# Patient Record
(demographics unavailable — no encounter records)

---

## 2024-10-30 NOTE — HISTORY OF PRESENT ILLNESS
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 For our records, please allow me to summarize the history and my findings.   This pleasant 51-year-old woman has a cardiovascular history significant for benign thyroid nodule status post RFA but otherwise healthy presenting with palpitations/SVT who is here for an initial evaluation.  She reports a long history of palpitations.  She had 2 episodes during her pregnancies.  More recently her episodes have increased.  On 10/24/2024 she was cooking and had sudden onset palpitations.  She had some stress at the time with her children.  She called EMS.  Upon EMS arrival her heart rate was noted to be in the 190s.  She was taken to the ED and received adenosine x 2.  Her heart rate broke afterwards.  She was discharged home without any medications.  She continues to feel ongoing palpitations.  She is very anxious about does not wish to return to the ED.  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

## 2024-10-30 NOTE — CARDIOLOGY SUMMARY
[de-identified] : 10/30/24 Sinus rhythm at 87 bpm [de-identified] : 10/25/24 CXR: who is here for an initial evaluation.

## 2024-10-30 NOTE — PHYSICAL EXAM
[Well Developed] : well developed [Well Nourished] : well nourished [No Acute Distress] : no acute distress [Normal Conjunctiva] : normal conjunctiva [Normal Venous Pressure] : normal venous pressure [Normal S1, S2] : normal S1, S2 [Clear Lung Fields] : clear lung fields [Good Air Entry] : good air entry [No Respiratory Distress] : no respiratory distress  [Soft] : abdomen soft [Non Tender] : non-tender [Normal Bowel Sounds] : normal bowel sounds [Normal Gait] : normal gait [No Edema] : no edema [No Cyanosis] : no cyanosis [No Clubbing] : no clubbing [No Varicosities] : no varicosities [No Rash] : no rash [No Skin Lesions] : no skin lesions [Moves all extremities] : moves all extremities [No Focal Deficits] : no focal deficits [Normal Speech] : normal speech [Alert and Oriented] : alert and oriented [Normal memory] : normal memory

## 2024-10-30 NOTE — DISCUSSION/SUMMARY
[EKG obtained to assist in diagnosis and management of assessed problem(s)] : EKG obtained to assist in diagnosis and management of assessed problem(s) [FreeTextEntry1] : In summary, this is a 51-year-old won man with benign thyroid nodule status post RFA but otherwise healthy presenting with palpitations/SVT who is here for an initial evaluation.   I reviewed her EKG from [place] and is consistent with a narrow complex SVT.  Given her recurrent symptoms, hospitalization, and anxiety about it she would benefit from an SVT ablation. The rationale for the procedure as well as its risks--including but not limited to bleeding, vascular injury, pericardial effusion/tamponade, heart block requiring pacemaker, stroke, and death--were reviewed in detail. After consideration of this information, the decision was made to proceed with the procedure.  I started her on Metoprolol 25mg XL daily today.  She also requested to wear a 1 week event monitor which I gave her today.  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  Thank you for allowing me to be involved in the care of this pleasant woman. Please feel free to contact me with any questions.

## 2024-12-05 NOTE — PHYSICAL EXAM
[Alert] : alert [Well Nourished] : well nourished [Healthy Appearance] : healthy appearance [No Acute Distress] : no acute distress [Well Developed] : well developed [Normal Voice/Communication] : normal voice communication [Normal Sclera/Conjunctiva] : normal sclera/conjunctiva [No Proptosis] : no proptosis [No Neck Mass] : no neck mass was observed [No LAD] : no lymphadenopathy [Supple] : the neck was supple [No Respiratory Distress] : no respiratory distress [Normal Affect] : the affect was normal [Normal Insight/Judgement] : insight and judgment were intact [Normal Mood] : the mood was normal [de-identified] : Approximately 4 cm palpable, mobile left thyroid nodule.

## 2024-12-05 NOTE — PHYSICAL EXAM
[Alert] : alert [Well Nourished] : well nourished [Healthy Appearance] : healthy appearance [No Acute Distress] : no acute distress [Well Developed] : well developed [Normal Voice/Communication] : normal voice communication [Normal Sclera/Conjunctiva] : normal sclera/conjunctiva [No Proptosis] : no proptosis [No Neck Mass] : no neck mass was observed [No LAD] : no lymphadenopathy [Supple] : the neck was supple [No Respiratory Distress] : no respiratory distress [Normal Affect] : the affect was normal [Normal Insight/Judgement] : insight and judgment were intact [Normal Mood] : the mood was normal [de-identified] : Approximately 4 cm palpable, mobile left thyroid nodule.

## 2024-12-10 NOTE — HISTORY OF PRESENT ILLNESS
[FreeTextEntry1] : CC: Thyroid nodule and pituitary adenoma This is a 52-year-old female with thyroid nodules, pituitary adenoma, vitamin D insufficiency, SVT, here for follow-up.  Reports she believes she first noticed lump on her neck in 2021. Thyroid ultrasound from June 2021 showed left 23 x 16 x 11 mm solid and cystic nodule with solid isoechoic mural nodularity. Subsequent thyroid ultrasound from September 2022 showed increase in size of nodule (2.9 x 1.4 x 1.4 cm). Thyroid ultrasound form June 2023 showed left 3.7 x 1.9 x 2.8 cm part cystic part solid isoechoic nodule. FNA 10/26/2023 of left lowerpole 3.6 cm thyroid nodule, benign ([place] II).  She saw Dr. Kong and underwent RFA on 05/17/2024 however, procedure was stopped early due to discomfort in ears.    Reports in 2012 was found to have hyperprolactinemia and pituitary adenoma was found on MRI pituitary.  MRI pituitary from 01/08/2024 showed a stable probable 4 mm pituitary microadenoma.   No history of radiation to head or neck. Denies obstructive symptoms. No family history of thyroid cancer.

## 2024-12-10 NOTE — HISTORY OF PRESENT ILLNESS
[FreeTextEntry1] : CC: Thyroid nodule and pituitary adenoma This is a 52-year-old female with thyroid nodules, pituitary adenoma, vitamin D insufficiency, SVT, here for follow-up.  Reports she believes she first noticed lump on her neck in 2021. Thyroid ultrasound from June 2021 showed left 23 x 16 x 11 mm solid and cystic nodule with solid isoechoic mural nodularity. Subsequent thyroid ultrasound from September 2022 showed increase in size of nodule (2.9 x 1.4 x 1.4 cm). Thyroid ultrasound form June 2023 showed left 3.7 x 1.9 x 2.8 cm part cystic part solid isoechoic nodule. FNA 10/26/2023 of left lowerpole 3.6 cm thyroid nodule, benign ([place] II).  She saw Dr. Kong and underwent RFA on 05/17/2024 however, procedure was stopped early due to discomfort in ears.    Reports in 2012 was found to have hyperprolactinemia and pituitary adenoma was found on MRI pituitary.  MRI pituitary from 01/08/2024 showed a stable probable 4 mm pituitary microadenoma.   No history of radiation to head or neck. Denies obstructive symptoms. No family history of thyroid cancer.

## 2024-12-10 NOTE — REASON FOR VISIT
[Consultation] : a consultation visit [Pituitary Evaluation/ Disorder] : pituitary evaluation/disorder [Thyroid nodule/ MNG] : thyroid nodule/ MNG [FreeTextEntry2] : Dr. Bishop

## 2024-12-10 NOTE — ASSESSMENT
[FreeTextEntry1] : This is a 52-year-old female with thyroid nodules, pituitary adenoma, vitamin D insufficiency, SVT, here for a follow-up.  1. Thyroid nodule She first noticed lump on her neck in 2021. Thyroid ultrasound from June 2021 showed left 23 x 16 x 11 mm solid and cystic nodule with solid isoechoic mural nodularity. Subsequent thyroid ultrasound from September 2022 showed increase in size of nodule (2.9 x 1.4 x 1.4 cm). Thyroid ultrasound form June 2023 showed left 3.7 x 1.9 x 2.8 cm part cystic part solid isoechoic nodule. FNA 10/26/2023 of left lower 3.6 cm thyroid nodule, benign ([place] II).  She saw Dr. Kong and underwent RFA on 05/17/2024 however, procedure was stopped early due to discomfort in ears.  Check thyroid ultrasound.  Check TFTs. She is clinically euthyroid. Reviewed risk of autonomously functioning thyroid nodule given large size. Signs/symptoms of hyperthyroidism reviewed. 2. Pituitary Adenoma  MRI pituitary from 01/08/2024 showed a stable probable 4 mm pituitary microadenoma.  Reports in 2012 was found to have hyperprolactinemia and pituitary adenoma was found on MRI pituitary.  Check anterior pituitary hormones.  Check prolactin.  Check MRI pituitary in January 2026.  3. Vitamin D insufficiency  She takes 2000 IU vitamin D3. Check 25 vitamin D.

## 2024-12-10 NOTE — ADDENDUM
[FreeTextEntry1] :  By signing my name below, I, Carey Andrews, attest that this document has been prepared under the direction and in the presence of Dr. Santos.  I, Pamela Santos MD, personally performed the services described in this documentation. All medical record entries made by the scribe were at my discretion and in my presence. I have reviewed the chart and discharge instructions (if applicable) and agree that the record reflects my personal permanence and is accurate and complete.

## 2024-12-10 NOTE — ASSESSMENT
[FreeTextEntry1] : This is a 52-year-old female with thyroid nodules, pituitary adenoma, vitamin D insufficiency, SVT, here for a follow-up.  1. Thyroid nodule She first noticed lump on her neck in 2021. Thyroid ultrasound from June 2021 showed left 23 x 16 x 11 mm solid and cystic nodule with solid isoechoic mural nodularity. Subsequent thyroid ultrasound from September 2022 showed increase in size of nodule (2.9 x 1.4 x 1.4 cm). Thyroid ultrasound form June 2023 showed left 3.7 x 1.9 x 2.8 cm part cystic part solid isoechoic nodule. FNA 10/26/2023 of left lower 3.6 cm thyroid nodule, benign ([place] II).  She saw Dr. Kong and underwent RFA on 05/17/2024 however, procedure was stopped early due to discomfort in ears.  Check thyroid ultrasound.  Check TFTs. She is clinically euthyroid. Reviewed risk of autonomously functioning thyroid nodule given large size. Signs/symptoms of hyperthyroidism reviewed. 2. Pituitary Adenoma  MRI pituitary from 01/08/2024 showed a stable probable 4 mm pituitary microadenoma.  Reports in 2012 was found to have hyperprolactinemia and pituitary adenoma was found on MRI pituitary.  Check anterior pituitary hormones.  Check prolactin.  Check MRI pituitary in January 2026.  3. Vitamin D insufficiency  She takes 2000 IU vitamin D3. Check 25 vitamin D.

## 2024-12-18 NOTE — CARDIOLOGY SUMMARY
[de-identified] : 12/18/24  Sinus rhythm at 81 bpm 10/30/24 Sinus rhythm at 87 bpm [de-identified] : 10/25/24 CXR: who is here for an initial evaluation.

## 2024-12-18 NOTE — DISCUSSION/SUMMARY
[EKG obtained to assist in diagnosis and management of assessed problem(s)] : EKG obtained to assist in diagnosis and management of assessed problem(s) [FreeTextEntry1] : In summary, this is a 52-year-old won man with benign thyroid nodule status post RFA, and AVNRT status post slow pathway modification.  She continues to do well post ablation without recurrent of her AVNRT.  Suspect her episodes in the 120s either sinus tach versus an AT.  Regardless she has only had 2 episodes and they have responded well to as needed metoprolol.  She will continue observation on as needed metoprolol at this time.  I will reevaluate her in 6 months.  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  Thank you for allowing me to be involved in the care of this pleasant woman. Please feel free to contact me with any questions.

## 2024-12-18 NOTE — HISTORY OF PRESENT ILLNESS
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 For our records, please allow me to summarize the history and my findings.   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  She had been experiencing a long history of palpitations.  Which had worsened more recently.  This required hospitalization on 10/24/2024.  It was adenosine sensitive.  She was subsequently brought to the EP lab as an outpatient and diagnosed with AVNRT.  She underwent slow pathway modification.  Post ablation she had 2 short episodes of heart rates in the 120s to 130s which improved with metoprolol.  She has not had any recurrent symptoms since.  Her right groin is healed well since the ablation.  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

## 2025-01-13 NOTE — HISTORY OF PRESENT ILLNESS
[FreeTextEntry8] : 52-year-old female here for acute visit with c/o elevated HR & SOB. Reports elevated HR of 104-112 for a few minutes while laying down a/w SOB. States HR lowered on its own. Also reports similar episode when sitting down on her computer. Took Metoprolol 25mg tab as needed for 3 days.  Notes hx of SVT 10/2024, s/p ablation 11/2024.  Denies palpitations, dizziness, or lightheadedness, chest pain, or fatigue at this time. Does endorse increased anxiety since her procedure. States she took CBD oil with some relief, & was able to sleep better.

## 2025-01-13 NOTE — ASSESSMENT
[FreeTextEntry1] : HR within normal limits. BP stable.  Given prev hx, EKG done today. EKG reviewed; stable.  Symptoms likely due to anxiety, discussed breathing techniques, relaxation techniques, and coping mechanisms. Pt will f/u with Cardiologist. Continue Metoprolol 25mg as needed.    Pt advised to f/u for persisting s/s / change in condition.

## 2025-01-13 NOTE — PHYSICAL EXAM
[No Acute Distress] : no acute distress [Well Nourished] : well nourished [Well Developed] : well developed [No Respiratory Distress] : no respiratory distress  [No Accessory Muscle Use] : no accessory muscle use [Clear to Auscultation] : lungs were clear to auscultation bilaterally [Normal Rate] : normal rate  [Regular Rhythm] : with a regular rhythm [Normal S1, S2] : normal S1 and S2 [Pedal Pulses Present] : the pedal pulses are present [No Edema] : there was no peripheral edema [No Extremity Clubbing/Cyanosis] : no extremity clubbing/cyanosis [Normal Insight/Judgement] : insight and judgment were intact [de-identified] : anxious

## 2025-05-21 NOTE — CURRENT MEDS
Pts job called [employer] and just would like a call back and an updated letter there was no date on the letter that was given to pt on 09/09/2024 please call [employer] at #[phone] and fax updated letter to #[phone]  mz   [Takes medication as prescribed] : takes [None] : Patient does not have any barriers to medication adherence

## 2025-05-21 NOTE — HEALTH RISK ASSESSMENT
[Good] : ~his/her~  mood as  good [No] : In the past 12 months have you used drugs other than those required for medical reasons? No [0] : 2) Feeling down, depressed, or hopeless: Not at all (0) [PHQ-2 Negative - No further assessment needed] : PHQ-2 Negative - No further assessment needed [Never] : Never [NO] : No [Patient reported mammogram was normal] : Patient reported mammogram was normal [Patient reported colonoscopy was normal] : Patient reported colonoscopy was normal [FreeTextEntry1] : health maintenance  [Time Spent: ___ Minutes] : I spent [unfilled] minutes performing a depression screening for this patient. [[id number]] : 0 [de-identified] : Current non-smoker  [MammogramDate] : 06/21 [MammogramComments] : BIRADS 1D- Negative-Dense [PapSmearComments] : n/a [BoneDensityComments] : n/a [ColonoscopyDate] : 10/23 [ColonoscopyComments] : Repeat colonoscopy in 10 years for screening purposes.

## 2025-05-21 NOTE — ADDENDUM
Pharyngitis   WHAT YOU NEED TO KNOW:   Pharyngitis, or sore throat, is inflammation of the tissues and structures in your pharynx (throat)  Pharyngitis is most often caused by bacteria  It may also be caused by a cold or flu virus  Other causes include smoking, allergies, or acid reflux  DISCHARGE INSTRUCTIONS:   Call 911 for any of the following:   · You have trouble breathing or swallowing because your throat is swollen or sore  Return to the emergency department if:   · You are drooling because it hurts too much to swallow  · Your fever is higher than 102? F (39?C) or lasts longer than 3 days  · You are confused  · You taste blood in your throat  Contact your healthcare provider if:   · Your throat pain gets worse  · You have a painful lump in your throat that does not go away after 5 days  · Your symptoms do not improve after 5 days  · You have questions or concerns about your condition or care  Medicines:  Viral pharyngitis will go away on its own without treatment  Your sore throat should start to feel better in 3 to 5 days for both viral and bacterial infections  You may need any of the following:  · Antibiotics  treat a bacterial infection  · NSAIDs , such as ibuprofen, help decrease swelling, pain, and fever  NSAIDs can cause stomach bleeding or kidney problems in certain people  If you take blood thinner medicine, always ask your healthcare provider if NSAIDs are safe for you  Always read the medicine label and follow directions  · Acetaminophen  decreases pain and fever  It is available without a doctor's order  Ask how much to take and how often to take it  Follow directions  Acetaminophen can cause liver damage if not taken correctly  · Take your medicine as directed  Contact your healthcare provider if you think your medicine is not helping or if you have side effects  Tell him or her if you are allergic to any medicine   Keep a list of the medicines, vitamins, [FreeTextEntry1] : I, Ganesh Santos, acted as a scribe on behalf of Dr. River Bishop MD, on 05/21/2025.   All medical entries made by the scribe were at my, Dr. River Bishop MD, direction and personally dictated by me on 05/21/2025. I have reviewed the chart and agree that the record accurately reflects my personal performance of the history, physical exam, assessment and plan. I have also personally directed, reviewed, and agreed with the chart. and herbs you take  Include the amounts, and when and why you take them  Bring the list or the pill bottles to follow-up visits  Carry your medicine list with you in case of an emergency  Manage your symptoms:   · Gargle salt water  Mix ¼ teaspoon salt in an 8 ounce glass of warm water and gargle  This may help decrease swelling in your throat  · Drink liquids as directed  You may need to drink more liquids than usual  Liquids may help soothe your throat and prevent dehydration  Ask how much liquid to drink each day and which liquids are best for you  · Use a cool-steam humidifier  to help moisten the air in your room and calm your cough  · Soothe your throat  with cough drops, ice, soft foods, or popsicles  Prevent the spread of pharyngitis:  Cover your mouth and nose when you cough or sneeze  Do not share food or drinks  Wash your hands often  Use soap and water  If soap and water are unavailable, use an alcohol based hand   Follow up with your healthcare provider as directed:  Write down your questions so you remember to ask them during your visits  © Copyright [address] [place] [address] Drive Information is for End User's use only and may not be sold, redistributed or otherwise used for commercial purposes  All illustrations and images included in CareNotes® are the copyrighted property of [employer]  or [facility] Flora Guerin  The above information is an  only  It is not intended as medical advice for individual conditions or treatments  Talk to your doctor, nurse or pharmacist before following any medical regimen to see if it is safe and effective for you

## 2025-05-21 NOTE — ASSESSMENT
[Vaccines Reviewed] : Immunizations reviewed today. Please see immunization details in the vaccine log within the immunization flowsheet.  [FreeTextEntry1] :   Annual Physical Exam: Paroxysmal SVT (supraventricular tachycardia), Pituitary lesion - BP is stable. Continue current management. - Check A1c, CBC, CMP, Lipid profile, Vitamin levels, Urinalysis, TSH   - RTO annually or as needed.   Pt verbalized understanding and will reach should any questions/concerns occur.

## 2025-05-21 NOTE — HISTORY OF PRESENT ILLNESS
[FreeTextEntry1] : Patient is present today to establish care and for a comprehensive Annual Physical Exam. [de-identified] : Patient is a 52yr old female who is present today to establish care and for a comprehensive Annual Physical Exam.  Patient is doing well overall. Patient reports Hx of arrythmia, ablation was done. Has been stable ever since ablation. Has not used Metoprolol since November, carries around for safety. Stays hydrated with water. Stress levels are stable. Reports she was stressed on the day of episode (screaming at kids), specialist denies being attributed to symptoms. Is currently still menstruating, also experiences constipation. Pt also c/o belly fat. Colonoscopy UTD and normal. Started Cilium about one week ago, confirms being active. Vision is stable. Denies anxiety and depression. Mammo UTD and normal. Sleep is normal. Has been following with Magnesium supplements. Has done first dose of Shingles vaccine, confirms reaction to injection. Denies intention to receive second dose. Reports thirty years ago, she experienced sore spot in upper chest which still presents to this day, possibly attributed to muscle.  Denies any CP, chest tightness or SOB. Denies any abdominal pain, urinary symptom, or change in bowel habits. Denies any fever, chills, or night sweats.

## 2025-05-28 NOTE — HISTORY OF PRESENT ILLNESS
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 For our records, please allow me to summarize the history and my findings.   This pleasant 52-year-old woman has a history significant for benign thyroid nodule status post RFA, recurrent AVNRT status post ablation on 11/21/2024.  She had been experiencing a long history of palpitations.  Which had worsened more recently.  This required hospitalization on 10/24/2024.  It was adenosine sensitive.  She was subsequently brought to the EP lab as an outpatient and diagnosed with AVNRT.  She underwent slow pathway modification. She has been doing well since without any further episodes.   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

## 2025-05-28 NOTE — DISCUSSION/SUMMARY
[EKG obtained to assist in diagnosis and management of assessed problem(s)] : EKG obtained to assist in diagnosis and management of assessed problem(s) [FreeTextEntry1] : In summary, this is a 52-year-old won man with benign thyroid nodule status post RFA, and AVNRT status post slow pathway modification.  She continues to do well post ablation without recurrent of her AVNRT.  She can follow-up in the office as needed.  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  Thank you for allowing me to be involved in the care of this pleasant woman. Please feel free to contact me with any questions.

## 2025-05-28 NOTE — CARDIOLOGY SUMMARY
[de-identified] : 5/28/25: sinu srhythm at 70 bpm 12/18/24  Sinus rhythm at 81 bpm 10/30/24 Sinus rhythm at 87 bpm [de-identified] : 10/25/24 CXR: who is here for an initial evaluation.